# Patient Record
Sex: FEMALE | Race: WHITE | NOT HISPANIC OR LATINO | URBAN - NONMETROPOLITAN AREA
[De-identification: names, ages, dates, MRNs, and addresses within clinical notes are randomized per-mention and may not be internally consistent; named-entity substitution may affect disease eponyms.]

---

## 2022-08-17 ENCOUNTER — DOCUMENTATION (OUTPATIENT)
Dept: FAMILY MEDICINE CLINIC | Facility: CLINIC | Age: 67
End: 2022-08-17

## 2022-08-17 PROBLEM — R27.9 UNSPECIFIED LACK OF COORDINATION: Status: ACTIVE | Noted: 2022-08-17

## 2022-08-17 PROBLEM — R26.9 UNSPECIFIED ABNORMALITIES OF GAIT AND MOBILITY: Status: ACTIVE | Noted: 2022-08-17

## 2022-08-17 PROBLEM — Z74.1 NEED FOR ASSISTANCE WITH PERSONAL CARE: Status: ACTIVE | Noted: 2022-08-17

## 2022-08-17 PROBLEM — M62.81 MUSCLE WEAKNESS: Status: ACTIVE | Noted: 2022-08-17

## 2022-08-17 RX ORDER — ATORVASTATIN CALCIUM 40 MG/1
40 TABLET, FILM COATED ORAL DAILY
COMMUNITY

## 2022-08-17 RX ORDER — ENOXAPARIN SODIUM 100 MG/ML
40 INJECTION SUBCUTANEOUS EVERY 12 HOURS SCHEDULED
COMMUNITY

## 2022-08-17 RX ORDER — LISINOPRIL 40 MG/1
40 TABLET ORAL DAILY
COMMUNITY

## 2022-08-17 RX ORDER — KETOTIFEN FUMARATE 0.35 MG/ML
1 SOLUTION/ DROPS OPHTHALMIC 2 TIMES DAILY PRN
COMMUNITY

## 2022-08-17 RX ORDER — ANORECTAL OINTMENT 15.7; .44; 24; 20.6 G/100G; G/100G; G/100G; G/100G
1 OINTMENT TOPICAL 2 TIMES DAILY
COMMUNITY

## 2022-08-17 RX ORDER — ROPINIROLE 0.5 MG/1
0.5 TABLET, FILM COATED ORAL 3 TIMES DAILY
COMMUNITY

## 2022-08-17 RX ORDER — ESCITALOPRAM OXALATE 10 MG/1
10 TABLET ORAL DAILY
COMMUNITY

## 2022-08-17 RX ORDER — ECHINACEA PURPUREA EXTRACT 125 MG
2 TABLET ORAL 2 TIMES DAILY PRN
COMMUNITY

## 2022-08-17 RX ORDER — SIMETHICONE 80 MG
80 TABLET,CHEWABLE ORAL EVERY 6 HOURS PRN
COMMUNITY

## 2022-08-17 RX ORDER — ACETAMINOPHEN 500 MG
1000 TABLET ORAL EVERY 8 HOURS PRN
COMMUNITY

## 2022-08-18 ENCOUNTER — NURSING HOME (OUTPATIENT)
Dept: INTERNAL MEDICINE | Facility: CLINIC | Age: 67
End: 2022-08-18

## 2022-08-18 VITALS
DIASTOLIC BLOOD PRESSURE: 70 MMHG | WEIGHT: 293 LBS | HEART RATE: 58 BPM | RESPIRATION RATE: 16 BRPM | TEMPERATURE: 98.7 F | OXYGEN SATURATION: 96 % | SYSTOLIC BLOOD PRESSURE: 132 MMHG

## 2022-08-18 DIAGNOSIS — C54.1 ENDOMETRIAL CANCER: ICD-10-CM

## 2022-08-18 DIAGNOSIS — R53.1 RIGHT SIDED WEAKNESS: ICD-10-CM

## 2022-08-18 DIAGNOSIS — K57.90 DIVERTICULOSIS: ICD-10-CM

## 2022-08-18 DIAGNOSIS — I10 ESSENTIAL HYPERTENSION: ICD-10-CM

## 2022-08-18 DIAGNOSIS — I63.9 CEREBROVASCULAR ACCIDENT (CVA), UNSPECIFIED MECHANISM: Primary | ICD-10-CM

## 2022-08-18 DIAGNOSIS — F32.A DEPRESSION, UNSPECIFIED DEPRESSION TYPE: ICD-10-CM

## 2022-08-18 DIAGNOSIS — E66.09 OBESITY DUE TO EXCESS CALORIES WITH SERIOUS COMORBIDITY, UNSPECIFIED CLASSIFICATION: ICD-10-CM

## 2022-08-18 DIAGNOSIS — G25.81 RESTLESS LEG SYNDROME: ICD-10-CM

## 2022-08-18 PROCEDURE — 99305 1ST NF CARE MODERATE MDM 35: CPT | Performed by: INTERNAL MEDICINE

## 2022-08-19 NOTE — PROGRESS NOTES
Nursing Home History and Physical       Villaastrid Rosenberg DO []  JOLENE Moreno []  [address] [place] [address]  Phone: [phone]  Fax: [phone] Eusebio Luong MD []  Macho Sesay DO [x]   [address] [place] [address]  Phone: [phone]  Fax: [phone]     PATIENT NAME: Arely Mcmanus                                                                          YOB: 1955           DATE OF SERVICE: 08/18/2022  FACILITY:  []  [place]   [] [facility]    [] [facility]   [] [facility]   []  [facility]   [x] [place] ______________________________________________________________________    CHIEF COMPLAINT:  Nursing facility admission      HISTORY OF PRESENT ILLNESS:   Patient is a 67-year-old white female with a history of endometrial cancer who was transferred [facility] this facility [facility] being [facility] at [email or web address] stroke service.  She initially presented after suffering sudden onset slurred speech, right upper extremity muscle weakness, pronator drift, and facial drooping while she was at her OB/GYN clinic.  At that time, her SBP was in the 180's.  Patient underwent stroke work-up and was found to have acute hematoma within the lateral left lentiform nucleus.  She was not found to have atrial fibrillation and was not a candidate for tPA or thrombectomy.  Patient was managed medically and has been making gradual progress with physical therapy.  Patient shared that she was feeling good that she was able to move her fingers better recently.  She continues to have discoordination in her right lower extremity as well as her right upper extremity.    Prior to this, patient was able to ambulate with a walker but with right arm weakness, this has become a greater challenge.  She is motivated to work with physical therapy in the facility.    PAST MEDICAL & SURGICAL HISTORY:   Past Medical History:   Diagnosis Date   • Allergic    • Cancer (HCC)     • Hypertension    • Obesity       Past Surgical History:   Procedure Laterality Date   • CHOLECYSTECTOMY     • COLON SURGERY     • HERNIA REPAIR     • NASAL MASS EXCISION      throat mass excision         MEDICATIONS:  I have reviewed and reconciled the patients medication list in the patients chart at the [facility] nursing [facility] on 08/18/2022.      ALLERGIES:  Allergies   Allergen Reactions   • Aspirin Unknown - High Severity   • Cortisone Unknown - High Severity   • Dairy Aid [Lactase] Unknown - High Severity   • Epinephrine Unknown - High Severity   • Latex Unknown - High Severity   • Nuts Unknown - High Severity     peanuts         SOCIAL HISTORY:  Social History     Socioeconomic History   • Marital status: Single       FAMILY HISTORY:  Family History   Problem Relation Age of Onset   • Heart disease Mother    • Cancer Father    • Stroke Maternal Grandmother    • Heart disease Maternal Grandfather         REVIEW OF SYSTEMS:  Review of Systems   Constitutional: Negative for chills, fatigue and fever.   HENT: Negative for congestion, ear pain, rhinorrhea, sinus pressure and sore throat.    Eyes: Negative for visual disturbance.   Respiratory: Negative for cough, chest tightness, shortness of breath and wheezing.    Cardiovascular: Negative for chest pain, palpitations and leg swelling.   Gastrointestinal: Negative for abdominal pain, blood in stool, constipation, diarrhea, nausea and vomiting.   Endocrine: Negative for polydipsia and polyuria.   Genitourinary: Negative for dysuria and hematuria.   Musculoskeletal: Negative for arthralgias and back pain.   Skin: Negative for rash.   Neurological: Negative for dizziness, light-headedness, numbness and headaches.   Psychiatric/Behavioral: Negative for dysphoric mood and sleep disturbance. The patient is not nervous/anxious.          PHYSICAL EXAMINATION:   VITAL SIGNS: /70   Pulse 58   Temp 98.7 °F (37.1 °C)   Resp 16   Wt 135 kg (298 lb)   SpO2 96%      Physical Exam  Vitals and nursing note reviewed.   Constitutional:       Appearance: Normal appearance. She is well-developed. She is obese.   HENT:      Head: Normocephalic and atraumatic.      Nose: Nose normal.      Mouth/Throat:      Mouth: Mucous membranes are moist.      Pharynx: No oropharyngeal exudate.   Eyes:      General: No scleral icterus.     Conjunctiva/sclera: Conjunctivae normal.      Pupils: Pupils are equal, round, and reactive to light.   Neck:      Thyroid: No thyromegaly.   Cardiovascular:      Rate and Rhythm: Normal rate and regular rhythm.      Heart sounds: Normal heart sounds. No murmur heard.    No friction rub. No gallop.   Pulmonary:      Effort: Pulmonary effort is normal. No respiratory distress.      Breath sounds: Normal breath sounds. No wheezing.   Abdominal:      General: Bowel sounds are normal. There is no distension.      Palpations: Abdomen is soft.      Tenderness: There is no abdominal tenderness.   Musculoskeletal:         General: No deformity or signs of injury.      Cervical back: Normal range of motion and neck supple.   Lymphadenopathy:      Cervical: No cervical adenopathy.   Skin:     General: Skin is warm and dry.      Findings: No rash.   Neurological:      Mental Status: She is alert and oriented to person, place, and time.      Comments: Right upper extremity weakness.  Able to move some fingers but without significant strength.  Able to move move right lower extremity however without precision.   Psychiatric:         Mood and Affect: Mood normal.         Behavior: Behavior normal.         RECORDS REVIEW:   Discharge Summary from [facility] 7/19/2022    ASSESSMENT   Diagnoses and all orders for this visit:    1. Cerebrovascular accident (CVA), unspecified mechanism (HCC) (Primary)    2. Right sided weakness    3. Depression, unspecified depression type    4. Essential hypertension    5. Restless leg syndrome    6. Endometrial cancer (HCC)    7.  Diverticulosis    8. Obesity due to excess calories with serious comorbidity, unspecified classification        PLAN    CVA with right-sided weakness  - Currently on Lovenox (given history of cancer)  - Continue physical therapy for strengthening and improving mobility  - Continue statin    Hyperlipidemia  - Stable on statin.    Essential hypertension  - Well-controlled on lisinopril.    Endometrial cancer  - Currently following with oncology at .    Obesity  - Complicates aspects of care.  Low fat diet encouraged.     Restless leg syndrome  - Stable on Requip    Diverticulosis  - no symptoms at this time.